# Patient Record
Sex: FEMALE | Race: WHITE | NOT HISPANIC OR LATINO
[De-identification: names, ages, dates, MRNs, and addresses within clinical notes are randomized per-mention and may not be internally consistent; named-entity substitution may affect disease eponyms.]

---

## 2017-01-03 ENCOUNTER — APPOINTMENT (OUTPATIENT)
Dept: GASTROENTEROLOGY | Facility: CLINIC | Age: 77
End: 2017-01-03

## 2017-01-03 VITALS
WEIGHT: 109 LBS | DIASTOLIC BLOOD PRESSURE: 60 MMHG | TEMPERATURE: 97.4 F | BODY MASS INDEX: 21.4 KG/M2 | HEIGHT: 60 IN | SYSTOLIC BLOOD PRESSURE: 102 MMHG | RESPIRATION RATE: 16 BRPM

## 2017-01-03 DIAGNOSIS — Z87.898 PERSONAL HISTORY OF OTHER SPECIFIED CONDITIONS: ICD-10-CM

## 2017-01-03 DIAGNOSIS — R11.10 VOMITING, UNSPECIFIED: ICD-10-CM

## 2017-01-03 DIAGNOSIS — R10.9 UNSPECIFIED ABDOMINAL PAIN: ICD-10-CM

## 2017-01-06 ENCOUNTER — OUTPATIENT (OUTPATIENT)
Dept: OUTPATIENT SERVICES | Facility: HOSPITAL | Age: 77
LOS: 1 days | Discharge: HOME | End: 2017-01-06
Payer: MEDICARE

## 2017-01-06 ENCOUNTER — APPOINTMENT (OUTPATIENT)
Dept: GASTROENTEROLOGY | Facility: HOSPITAL | Age: 77
End: 2017-01-06

## 2017-01-06 DIAGNOSIS — R19.7 DIARRHEA, UNSPECIFIED: ICD-10-CM

## 2017-01-06 PROCEDURE — 88305 TISSUE EXAM BY PATHOLOGIST: CPT | Mod: 26

## 2017-01-09 LAB — SURGICAL PATHOLOGY STUDY: SIGNIFICANT CHANGE UP

## 2017-03-13 ENCOUNTER — RX RENEWAL (OUTPATIENT)
Age: 77
End: 2017-03-13

## 2018-10-16 ENCOUNTER — APPOINTMENT (OUTPATIENT)
Dept: GASTROENTEROLOGY | Facility: CLINIC | Age: 78
End: 2018-10-16
Payer: MEDICARE

## 2018-10-16 VITALS
HEIGHT: 60 IN | WEIGHT: 110 LBS | BODY MASS INDEX: 21.6 KG/M2 | DIASTOLIC BLOOD PRESSURE: 70 MMHG | TEMPERATURE: 97.8 F | SYSTOLIC BLOOD PRESSURE: 130 MMHG

## 2018-10-16 DIAGNOSIS — B02.9 ZOSTER W/OUT COMPLICATIONS: ICD-10-CM

## 2018-10-16 DIAGNOSIS — R74.8 ABNORMAL LEVELS OF OTHER SERUM ENZYMES: ICD-10-CM

## 2018-10-16 PROCEDURE — 99214 OFFICE O/P EST MOD 30 MIN: CPT

## 2018-10-16 RX ORDER — SODIUM SULFATE, POTASSIUM SULFATE, MAGNESIUM SULFATE 17.5; 3.13; 1.6 G/ML; G/ML; G/ML
17.5-3.13-1.6 SOLUTION, CONCENTRATE ORAL
Qty: 1 | Refills: 0 | Status: DISCONTINUED | COMMUNITY
Start: 2017-01-03 | End: 2018-10-16

## 2018-10-16 RX ORDER — MECLIZINE HYDROCHLORIDE 12.5 MG/1
12.5 TABLET ORAL
Qty: 30 | Refills: 0 | Status: ACTIVE | COMMUNITY
Start: 2018-06-14

## 2019-06-25 ENCOUNTER — APPOINTMENT (OUTPATIENT)
Dept: GASTROENTEROLOGY | Facility: CLINIC | Age: 79
End: 2019-06-25
Payer: MEDICARE

## 2019-06-25 VITALS
OXYGEN SATURATION: 98 % | BODY MASS INDEX: 21.6 KG/M2 | WEIGHT: 110 LBS | HEIGHT: 60 IN | RESPIRATION RATE: 16 BRPM | DIASTOLIC BLOOD PRESSURE: 73 MMHG | SYSTOLIC BLOOD PRESSURE: 122 MMHG | TEMPERATURE: 97.8 F | HEART RATE: 75 BPM

## 2019-06-25 DIAGNOSIS — R10.30 LOWER ABDOMINAL PAIN, UNSPECIFIED: ICD-10-CM

## 2019-06-25 PROCEDURE — 99214 OFFICE O/P EST MOD 30 MIN: CPT

## 2019-06-25 RX ORDER — VALACYCLOVIR 1 G/1
1 TABLET, FILM COATED ORAL
Qty: 21 | Refills: 0 | Status: DISCONTINUED | COMMUNITY
Start: 2018-09-05 | End: 2019-06-25

## 2019-06-25 RX ORDER — GABAPENTIN 100 MG/1
100 CAPSULE ORAL
Qty: 14 | Refills: 0 | Status: DISCONTINUED | COMMUNITY
Start: 2018-09-05 | End: 2019-06-25

## 2019-06-25 RX ORDER — SUCRALFATE 1 G/1
1 TABLET ORAL
Qty: 10 | Refills: 0 | Status: DISCONTINUED | COMMUNITY
Start: 2018-06-08 | End: 2019-06-25

## 2019-06-25 RX ORDER — ALPRAZOLAM 0.5 MG/1
0.5 TABLET ORAL
Qty: 90 | Refills: 0 | Status: ACTIVE | COMMUNITY
Start: 2019-06-25 | End: 1900-01-01

## 2019-06-25 RX ORDER — VALACYCLOVIR 500 MG/1
500 TABLET, FILM COATED ORAL
Qty: 21 | Refills: 0 | Status: DISCONTINUED | COMMUNITY
Start: 2018-09-14 | End: 2019-06-25

## 2019-06-25 NOTE — PHYSICAL EXAM
[General Appearance - Alert] : alert [General Appearance - In No Acute Distress] : in no acute distress [Sclera] : the sclera and conjunctiva were normal [Extraocular Movements] : extraocular movements were intact [PERRL With Normal Accommodation] : pupils were equal in size, round, and reactive to light [Outer Ear] : the ears and nose were normal in appearance [Oropharynx] : the oropharynx was normal [Neck Appearance] : the appearance of the neck was normal [Neck Cervical Mass (___cm)] : no neck mass was observed [Jugular Venous Distention Increased] : there was no jugular-venous distention [Thyroid Diffuse Enlargement] : the thyroid was not enlarged [Thyroid Nodule] : there were no palpable thyroid nodules [Auscultation Breath Sounds / Voice Sounds] : lungs were clear to auscultation bilaterally [Heart Sounds] : normal S1 and S2 [Heart Rate And Rhythm] : heart rate was normal and rhythm regular [Murmurs] : no murmurs [Heart Sounds Gallop] : no gallops [Heart Sounds Pericardial Friction Rub] : no pericardial rub [Abdomen Soft] : soft [Bowel Sounds] : normal bowel sounds [Abdomen Tenderness] : non-tender [] : no hepato-splenomegaly [Abdomen Mass (___ Cm)] : no abdominal mass palpated [No CVA Tenderness] : no ~M costovertebral angle tenderness [No Spinal Tenderness] : no spinal tenderness [Abnormal Walk] : normal gait [Nail Clubbing] : no clubbing  or cyanosis of the fingernails [Musculoskeletal - Swelling] : no joint swelling seen [Motor Tone] : muscle strength and tone were normal [Oriented To Time, Place, And Person] : oriented to person, place, and time [Impaired Insight] : insight and judgment were intact [Affect] : the affect was normal

## 2019-06-25 NOTE — HISTORY OF PRESENT ILLNESS
[FreeTextEntry1] : Patient is a 79-year-old female with complaints of suprapubic cramping which started about 6 weeks ago. The discomfort occurs daily. She saw her gynecologist and GYN exam was negative apparently. Her abdominal discomfort is associated with some nausea. She had a UTI 3 weeks ago and took Cipro for about 10-14 days. Her bowel movements are regular. She had one episode of rectal urgency and needs to have an immediate bowel movement. She denies any fever. She denies any blood in the stool.\par Patient had a colonoscopy in 2017. 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 She has not had a flare in over 2 years. She still has some  irritable bowel syndrome symptoms.\par

## 2019-06-25 NOTE — ASSESSMENT
[FreeTextEntry1] : Patient with 6 week history of lower abdominal discomfort. There is no evidence of ischemic colitis or diverticulitis. This may be an exacerbation of her IBS symptoms.\par She does have a history of liver cysts and a pancreatic cyst and a followup a sonogram will be ordered.\par The patient will be suggested to take IB jeannette and resume Prilosec daily.

## 2019-11-05 ENCOUNTER — OUTPATIENT (OUTPATIENT)
Dept: OUTPATIENT SERVICES | Facility: HOSPITAL | Age: 79
LOS: 1 days | End: 2019-11-05
Payer: MEDICARE

## 2019-11-05 VITALS
RESPIRATION RATE: 16 BRPM | WEIGHT: 113.1 LBS | OXYGEN SATURATION: 100 % | HEIGHT: 60 IN | TEMPERATURE: 98 F | DIASTOLIC BLOOD PRESSURE: 74 MMHG | SYSTOLIC BLOOD PRESSURE: 172 MMHG | HEART RATE: 76 BPM

## 2019-11-05 DIAGNOSIS — Z98.890 OTHER SPECIFIED POSTPROCEDURAL STATES: Chronic | ICD-10-CM

## 2019-11-05 DIAGNOSIS — R94.39 ABNORMAL RESULT OF OTHER CARDIOVASCULAR FUNCTION STUDY: ICD-10-CM

## 2019-11-05 DIAGNOSIS — Z98.51 TUBAL LIGATION STATUS: Chronic | ICD-10-CM

## 2019-11-05 LAB
ALBUMIN SERPL ELPH-MCNC: 4.5 G/DL — SIGNIFICANT CHANGE UP (ref 3.3–5)
ALP SERPL-CCNC: 126 U/L — HIGH (ref 40–120)
ALT FLD-CCNC: 26 U/L — SIGNIFICANT CHANGE UP (ref 10–45)
ANION GAP SERPL CALC-SCNC: 16 MMOL/L — SIGNIFICANT CHANGE UP (ref 5–17)
AST SERPL-CCNC: 30 U/L — SIGNIFICANT CHANGE UP (ref 10–40)
BILIRUB SERPL-MCNC: 0.8 MG/DL — SIGNIFICANT CHANGE UP (ref 0.2–1.2)
BUN SERPL-MCNC: 15 MG/DL — SIGNIFICANT CHANGE UP (ref 7–23)
CALCIUM SERPL-MCNC: 9.9 MG/DL — SIGNIFICANT CHANGE UP (ref 8.4–10.5)
CHLORIDE SERPL-SCNC: 98 MMOL/L — SIGNIFICANT CHANGE UP (ref 96–108)
CO2 SERPL-SCNC: 19 MMOL/L — LOW (ref 22–31)
CREAT SERPL-MCNC: 0.92 MG/DL — SIGNIFICANT CHANGE UP (ref 0.5–1.3)
GLUCOSE SERPL-MCNC: 92 MG/DL — SIGNIFICANT CHANGE UP (ref 70–99)
HCT VFR BLD CALC: 40.1 % — SIGNIFICANT CHANGE UP (ref 34.5–45)
HGB BLD-MCNC: 13.5 G/DL — SIGNIFICANT CHANGE UP (ref 11.5–15.5)
MCHC RBC-ENTMCNC: 30 PG — SIGNIFICANT CHANGE UP (ref 27–34)
MCHC RBC-ENTMCNC: 33.7 GM/DL — SIGNIFICANT CHANGE UP (ref 32–36)
MCV RBC AUTO: 89.1 FL — SIGNIFICANT CHANGE UP (ref 80–100)
NRBC # BLD: 0 /100 WBCS — SIGNIFICANT CHANGE UP (ref 0–0)
PLATELET # BLD AUTO: 205 K/UL — SIGNIFICANT CHANGE UP (ref 150–400)
POTASSIUM SERPL-MCNC: 4.6 MMOL/L — SIGNIFICANT CHANGE UP (ref 3.5–5.3)
POTASSIUM SERPL-SCNC: 4.6 MMOL/L — SIGNIFICANT CHANGE UP (ref 3.5–5.3)
PROT SERPL-MCNC: 7.5 G/DL — SIGNIFICANT CHANGE UP (ref 6–8.3)
RBC # BLD: 4.5 M/UL — SIGNIFICANT CHANGE UP (ref 3.8–5.2)
RBC # FLD: 11.8 % — SIGNIFICANT CHANGE UP (ref 10.3–14.5)
SODIUM SERPL-SCNC: 133 MMOL/L — LOW (ref 135–145)
WBC # BLD: 7.21 K/UL — SIGNIFICANT CHANGE UP (ref 3.8–10.5)
WBC # FLD AUTO: 7.21 K/UL — SIGNIFICANT CHANGE UP (ref 3.8–10.5)

## 2019-11-05 PROCEDURE — 85027 COMPLETE CBC AUTOMATED: CPT

## 2019-11-05 PROCEDURE — 93010 ELECTROCARDIOGRAM REPORT: CPT

## 2019-11-05 PROCEDURE — C1887: CPT

## 2019-11-05 PROCEDURE — 93005 ELECTROCARDIOGRAM TRACING: CPT

## 2019-11-05 PROCEDURE — 80053 COMPREHEN METABOLIC PANEL: CPT

## 2019-11-05 PROCEDURE — 93458 L HRT ARTERY/VENTRICLE ANGIO: CPT

## 2019-11-05 PROCEDURE — C1769: CPT

## 2019-11-05 PROCEDURE — C1894: CPT

## 2019-11-05 RX ORDER — MECLIZINE HCL 12.5 MG
1 TABLET ORAL
Qty: 0 | Refills: 0 | DISCHARGE

## 2019-11-05 RX ORDER — SODIUM CHLORIDE 9 MG/ML
3 INJECTION INTRAMUSCULAR; INTRAVENOUS; SUBCUTANEOUS EVERY 8 HOURS
Refills: 0 | Status: DISCONTINUED | OUTPATIENT
Start: 2019-11-05 | End: 2019-11-22

## 2019-11-05 RX ORDER — ASPIRIN/CALCIUM CARB/MAGNESIUM 324 MG
1 TABLET ORAL
Qty: 0 | Refills: 0 | DISCHARGE

## 2019-11-05 RX ORDER — ALPRAZOLAM 0.25 MG
1 TABLET ORAL
Qty: 0 | Refills: 0 | DISCHARGE

## 2019-11-05 RX ORDER — LEVOTHYROXINE SODIUM 125 MCG
1 TABLET ORAL
Qty: 0 | Refills: 0 | DISCHARGE

## 2019-11-05 RX ORDER — CALCITRIOL 0.5 UG/1
1 CAPSULE ORAL
Qty: 0 | Refills: 0 | DISCHARGE

## 2019-11-05 RX ORDER — ATORVASTATIN CALCIUM 80 MG/1
1 TABLET, FILM COATED ORAL
Qty: 0 | Refills: 0 | DISCHARGE

## 2019-11-05 NOTE — H&P CARDIOLOGY - FAMILY HISTORY
Mother  Still living? No  Family history of heart attack, Age at diagnosis: Age Unknown     Sibling  Still living? Yes, Estimated age: Age Unknown  Family history of CABG, Age at diagnosis: Age Unknown     Father  Still living? No  Family history of liver cancer, Age at diagnosis: Age Unknown

## 2019-11-05 NOTE — H&P CARDIOLOGY - HISTORY OF PRESENT ILLNESS
78 yo female no implantable device with PMHx of HTN, HLD, hypothyroidism, anxiety present for cardiac cath. pt reports she has been feeling chest heaviness, not affected with movement, evaluated by Dr. Espinosa and had abnormal stress test. pt denies chest pain, SOB or dizziness currently.   Stress test on 10/29/2019: mildly reduced perfusion defect of small size in anteroapical wall, EF 72%

## 2019-12-17 ENCOUNTER — APPOINTMENT (OUTPATIENT)
Dept: GASTROENTEROLOGY | Facility: CLINIC | Age: 79
End: 2019-12-17
Payer: MEDICARE

## 2019-12-17 VITALS
HEART RATE: 65 BPM | SYSTOLIC BLOOD PRESSURE: 125 MMHG | RESPIRATION RATE: 17 BRPM | DIASTOLIC BLOOD PRESSURE: 75 MMHG | OXYGEN SATURATION: 94 % | WEIGHT: 112 LBS | HEIGHT: 60 IN | BODY MASS INDEX: 21.99 KG/M2 | TEMPERATURE: 97.4 F

## 2019-12-17 DIAGNOSIS — R07.89 OTHER CHEST PAIN: ICD-10-CM

## 2019-12-17 PROCEDURE — 99214 OFFICE O/P EST MOD 30 MIN: CPT

## 2019-12-17 NOTE — ASSESSMENT
[FreeTextEntry1] : Patient with history of ischemic colitis and irritable bowel syndrome. Both are stable.\par \par She had several episodes of substernal chest discomfort. Cardiac workup was negative. There was no evidence of pulmonary embolism.\par She may be suffering from esophageal spasm. She was told to take Gaviscon on a p.r.n. basis when these symptoms occur.\par

## 2019-12-17 NOTE — PHYSICAL EXAM
[General Appearance - Alert] : alert [General Appearance - In No Acute Distress] : in no acute distress [Sclera] : the sclera and conjunctiva were normal [PERRL With Normal Accommodation] : pupils were equal in size, round, and reactive to light [Extraocular Movements] : extraocular movements were intact [Outer Ear] : the ears and nose were normal in appearance [Oropharynx] : the oropharynx was normal [Neck Cervical Mass (___cm)] : no neck mass was observed [Neck Appearance] : the appearance of the neck was normal [Jugular Venous Distention Increased] : there was no jugular-venous distention [Thyroid Diffuse Enlargement] : the thyroid was not enlarged [Thyroid Nodule] : there were no palpable thyroid nodules [Auscultation Breath Sounds / Voice Sounds] : lungs were clear to auscultation bilaterally [Heart Sounds Gallop] : no gallops [Heart Rate And Rhythm] : heart rate was normal and rhythm regular [Heart Sounds] : normal S1 and S2 [Murmurs] : no murmurs [Heart Sounds Pericardial Friction Rub] : no pericardial rub [Bowel Sounds] : normal bowel sounds [Abdomen Soft] : soft [Abdomen Tenderness] : non-tender [] : no hepato-splenomegaly [Abdomen Mass (___ Cm)] : no abdominal mass palpated [No CVA Tenderness] : no ~M costovertebral angle tenderness [No Spinal Tenderness] : no spinal tenderness [Abnormal Walk] : normal gait [Nail Clubbing] : no clubbing  or cyanosis of the fingernails [Musculoskeletal - Swelling] : no joint swelling seen [Motor Tone] : muscle strength and tone were normal [Impaired Insight] : insight and judgment were intact [Oriented To Time, Place, And Person] : oriented to person, place, and time [Affect] : the affect was normal

## 2019-12-17 NOTE — HISTORY OF PRESENT ILLNESS
[FreeTextEntry1] : Patient is a 79-year-old female with complaints of substernal chest pressure last month. Had cardiac w/u including cath with no evidence of CAD according to patient. Has not any recurrence. No heartburn or regurgitation.\par Also had elevated D dimer and PE r/o. \par \par Patient had a colonoscopy in 2017. 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 She has not had a flare in almost 3  years. She still has some  irritable bowel syndrome symptoms. She is off IB jeannette.\par

## 2020-04-07 PROBLEM — F41.9 ANXIETY DISORDER, UNSPECIFIED: Chronic | Status: ACTIVE | Noted: 2019-11-05

## 2020-04-07 PROBLEM — E78.5 HYPERLIPIDEMIA, UNSPECIFIED: Chronic | Status: ACTIVE | Noted: 2019-11-05

## 2020-04-07 PROBLEM — E03.9 HYPOTHYROIDISM, UNSPECIFIED: Chronic | Status: ACTIVE | Noted: 2019-11-05

## 2020-04-07 PROBLEM — R42 DIZZINESS AND GIDDINESS: Chronic | Status: ACTIVE | Noted: 2019-11-05

## 2020-04-07 PROBLEM — I10 ESSENTIAL (PRIMARY) HYPERTENSION: Chronic | Status: ACTIVE | Noted: 2019-11-05

## 2020-06-02 ENCOUNTER — APPOINTMENT (OUTPATIENT)
Dept: GASTROENTEROLOGY | Facility: CLINIC | Age: 80
End: 2020-06-02

## 2020-10-02 ENCOUNTER — APPOINTMENT (OUTPATIENT)
Dept: GASTROENTEROLOGY | Facility: CLINIC | Age: 80
End: 2020-10-02
Payer: MEDICARE

## 2020-10-02 VITALS
HEART RATE: 79 BPM | TEMPERATURE: 98.2 F | OXYGEN SATURATION: 96 % | SYSTOLIC BLOOD PRESSURE: 147 MMHG | WEIGHT: 112 LBS | HEIGHT: 60 IN | RESPIRATION RATE: 17 BRPM | DIASTOLIC BLOOD PRESSURE: 78 MMHG | BODY MASS INDEX: 21.99 KG/M2

## 2020-10-02 DIAGNOSIS — R10.9 UNSPECIFIED ABDOMINAL PAIN: ICD-10-CM

## 2020-10-02 PROCEDURE — 99214 OFFICE O/P EST MOD 30 MIN: CPT

## 2020-10-02 RX ORDER — WHEAT DEXTRIN 3 G/4 G
POWDER (GRAM) ORAL
Qty: 1 | Refills: 0 | Status: ACTIVE | OUTPATIENT
Start: 2020-10-02

## 2020-10-02 NOTE — REVIEW OF SYSTEMS
[As Noted in HPI] : as noted in HPI [Abdominal Pain] : abdominal pain [Diarrhea] : diarrhea [Negative] : Heme/Lymph [Vomiting] : no vomiting [Constipation] : no constipation [Heartburn] : no heartburn [Melena] : no melena

## 2020-10-02 NOTE — ASSESSMENT
[FreeTextEntry1] : 1- Abdominal Pain\par Sonogram of the Abdomen and Pelvis ordered the risks benefits alternatives and complications of the procedure/s were explained to the patient at length. The patient was agreeable and we will proceed.\par \par There is no evidence of colitis or diverticulitis. This may be an exacerbation of her IBS symptoms. \par Patient will continue to take IB-Guard as prescribed for IBS symptoms. \par \par 2- Diarrhea \par \par Patient started on Flagyl PO for 5 day course discussed at length side effects and adverse reactions. \par Instructed patient to take the probiotic Align to restore gut of normal. \par \par \par We discussed diarrhea at length. Treatment depends on the cause and severity of your diarrhea. You\par should drink plenty of fluids to avoid dehydration. You should avoid drinks that contain caffeine and\par milk. Milk may make diarrhea worse. Your doctor may recommend hydration drinks for your infant or\par child. People with severe dehydration may need fluid replacement via an IV line and hospitalization.\par Avoid eating greasy foods, fatty foods, and alcohol. Bananas, applesauce, rice, and toast are helpful\par foods to eat. If you feel too sick to eat, try sucking on ice chips until you can tolerate food.\par \par Patient verbalized understanding of all information provided. All questions answered and reviewed. \par

## 2020-10-02 NOTE — PHYSICAL EXAM
[General Appearance - Alert] : alert [General Appearance - In No Acute Distress] : in no acute distress [Sclera] : the sclera and conjunctiva were normal [PERRL With Normal Accommodation] : pupils were equal in size, round, and reactive to light [Extraocular Movements] : extraocular movements were intact [Outer Ear] : the ears and nose were normal in appearance [Oropharynx] : the oropharynx was normal [Neck Appearance] : the appearance of the neck was normal [Neck Cervical Mass (___cm)] : no neck mass was observed [Jugular Venous Distention Increased] : there was no jugular-venous distention [Thyroid Diffuse Enlargement] : the thyroid was not enlarged [Thyroid Nodule] : there were no palpable thyroid nodules [] : no respiratory distress [Auscultation Breath Sounds / Voice Sounds] : lungs were clear to auscultation bilaterally [Heart Rate And Rhythm] : heart rate was normal and rhythm regular [Heart Sounds] : normal S1 and S2 [Heart Sounds Gallop] : no gallops [Murmurs] : no murmurs [Heart Sounds Pericardial Friction Rub] : no pericardial rub [Flat] : flat [Normal] : normal [Soft, Nontender] : the abdomen was soft and nontender [Cervical Lymph Nodes Enlarged Posterior Bilaterally] : posterior cervical [Cervical Lymph Nodes Enlarged Anterior Bilaterally] : anterior cervical [Supraclavicular Lymph Nodes Enlarged Bilaterally] : supraclavicular [No CVA Tenderness] : no ~M costovertebral angle tenderness [No Spinal Tenderness] : no spinal tenderness [Oriented To Time, Place, And Person] : oriented to person, place, and time [Impaired Insight] : insight and judgment were intact [Affect] : the affect was normal [Firm] : not firm [Rigid] : not rigid [Rebound] : no rebound [Guarding] : no guarding

## 2020-10-02 NOTE — HISTORY OF PRESENT ILLNESS
[de-identified] : Patient is a 80-year-old female with complaints of suprapubic cramping which started about 6 weeks ago. The discomfort occurs intermittently. She saw her gynecologist and GYN exam was negative apparently. Her abdominal discomfort is not associated with nausea or vomiting. She previously visited [place] and noticed the abdominal pain along with diarrhea. She denies any fever. She denies any blood in the stool. No recent colitis episodes at his time. Patient had a colonoscopy in 2017. 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 She has not had a flare in over 3 years. She still has some irritable bowel syndrome symptoms.\par

## 2021-04-13 ENCOUNTER — APPOINTMENT (OUTPATIENT)
Dept: GASTROENTEROLOGY | Facility: CLINIC | Age: 81
End: 2021-04-13
Payer: MEDICARE

## 2021-04-13 VITALS
WEIGHT: 115 LBS | SYSTOLIC BLOOD PRESSURE: 135 MMHG | HEART RATE: 73 BPM | RESPIRATION RATE: 17 BRPM | HEIGHT: 60 IN | TEMPERATURE: 97.5 F | DIASTOLIC BLOOD PRESSURE: 63 MMHG | OXYGEN SATURATION: 98 % | BODY MASS INDEX: 22.58 KG/M2

## 2021-04-13 DIAGNOSIS — K52.9 NONINFECTIVE GASTROENTERITIS AND COLITIS, UNSPECIFIED: ICD-10-CM

## 2021-04-13 PROCEDURE — 99214 OFFICE O/P EST MOD 30 MIN: CPT

## 2021-04-13 RX ORDER — BISMUTH SUBSALICYLATE 525 MG/1
TABLET ORAL
Qty: 30 | Refills: 0 | Status: ACTIVE | COMMUNITY
Start: 2020-10-02

## 2021-04-13 NOTE — ASSESSMENT
[FreeTextEntry1] : Patient with stable IBS and no further episodes of ischemic colitis.\par She does not have hepatic cysts and on her last sonogram there was some increase in the size of the left hepatic cysts.  She does have a family history of liver cancer and a follow-up sonogram will be ordered.\par She will continue to take IBgard and align on a as needed basis.

## 2021-04-13 NOTE — HISTORY OF PRESENT ILLNESS
[FreeTextEntry1] : Patient is a 80-year-old female with complaints of substernal chest pressure last year. Had cardiac w/u including cath with no evidence of CAD according to patient. Has not any recurrence. No heartburn or regurgitation.\par Also had elevated D dimer and PE r/o. \par Patient had a colonoscopy in 2017. 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 She has not had a flare in almost 4  years. She still has some  irritable bowel syndrome symptoms. She is on IB jeannette PRN and Align PRN.\par \par She had an abdominal sonogram in October that revealed some enlargement of her left hepatic liver cysts.  This was compared to a previous sonogram.  She has no pain.  Her weight is stable.\par The cyst on the right lobe is stable.  She does have a family history of liver cancer.  Her father had liver cancer.\par \par \par

## 2021-07-30 ENCOUNTER — APPOINTMENT (OUTPATIENT)
Dept: GASTROENTEROLOGY | Facility: CLINIC | Age: 81
End: 2021-07-30
Payer: MEDICARE

## 2021-07-30 VITALS
WEIGHT: 107 LBS | SYSTOLIC BLOOD PRESSURE: 163 MMHG | TEMPERATURE: 98.1 F | DIASTOLIC BLOOD PRESSURE: 79 MMHG | BODY MASS INDEX: 21.01 KG/M2 | OXYGEN SATURATION: 99 % | HEIGHT: 60 IN | HEART RATE: 69 BPM

## 2021-07-30 DIAGNOSIS — R63.4 ABNORMAL WEIGHT LOSS: ICD-10-CM

## 2021-07-30 DIAGNOSIS — Z23 ENCOUNTER FOR IMMUNIZATION: ICD-10-CM

## 2021-07-30 DIAGNOSIS — R68.81 EARLY SATIETY: ICD-10-CM

## 2021-07-30 DIAGNOSIS — R63.0 ANOREXIA: ICD-10-CM

## 2021-07-30 PROCEDURE — 99214 OFFICE O/P EST MOD 30 MIN: CPT

## 2021-07-30 RX ORDER — METRONIDAZOLE 500 MG/1
500 TABLET ORAL 3 TIMES DAILY
Qty: 15 | Refills: 0 | Status: DISCONTINUED | COMMUNITY
Start: 2020-10-02 | End: 2021-07-30

## 2021-08-01 PROBLEM — R63.4 WEIGHT LOSS: Status: ACTIVE | Noted: 2021-07-30

## 2021-08-01 PROBLEM — Z23 COVID-19 VACCINE ADMINISTERED: Status: ACTIVE | Noted: 2021-07-30

## 2021-08-01 RX ORDER — ATORVASTATIN CALCIUM 10 MG/1
10 TABLET, FILM COATED ORAL
Qty: 90 | Refills: 0 | Status: ACTIVE | COMMUNITY
Start: 2021-07-08

## 2021-08-01 RX ORDER — LATANOPROST/PF 0.005 %
0.01 DROPS OPHTHALMIC (EYE)
Qty: 8 | Refills: 0 | Status: ACTIVE | COMMUNITY
Start: 2021-07-07

## 2021-08-01 RX ORDER — PEPPERMINT OIL 90 MG
90 CAPSULE, DELAYED, AND EXTENDED RELEASE ORAL
Qty: 60 | Refills: 0 | Status: ACTIVE | COMMUNITY
Start: 2020-10-02

## 2021-08-01 NOTE — REVIEW OF SYSTEMS
[Feeling Tired] : feeling tired [Recent Weight Loss (___ Lbs)] : recent [unfilled] ~Ulb weight loss [Eyesight Problems] : eyesight problems [As Noted in HPI] : as noted in HPI [Negative] : Heme/Lymph

## 2021-08-01 NOTE — ASSESSMENT
[FreeTextEntry1] : Patient with early satiety and decreased appetite.  She claims that she continues to lose some weight.  She has no abdominal pain and her nausea has improved.  She has no further chest pain.\par Because of her enlarging hepatic cysts and family history of liver cancer, a CAT scan of the abdomen and pelvis will be ordered.  An upper endoscopy will be scheduled\par She had recent blood work and she will bring that into the office.

## 2021-08-01 NOTE — HISTORY OF PRESENT ILLNESS
[FreeTextEntry1] : Patient is a 80-year-old female with complaints of substernal chest pressure last year. Had cardiac w/u including cath with no evidence of CAD according to patient. Has not any recurrence. No heartburn or regurgitation.\par Also had elevated D dimer and PE r/o. \par Patient had a colonoscopy in 2017. 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 She has not had a flare in almost 4  years. She still has some  irritable bowel syndrome symptoms. She is on IB jeannette PRN and Align PRN.\par \par She had an abdominal sonogram in October that revealed some enlargement of her left hepatic liver cysts.  This was compared to a previous sonogram.  She has no pain.  Her weight is stable.\par The cyst on the right lobe is stable.  She does have a family history of liver cancer.  Her father had liver cancer.\par \par Patient has no further chest pain no nausea.  Her bowel movements are irregular with alternating normal bowel movements and constipation.  She takes milk of magnesia on a as needed basis.  She has had no further attacks of abdominal pain.\par However, she is eating smaller portions and has a decreased appetite and has lost about 8 pounds.\par She recently had a pelvic sonogram that revealed some fibroids but no other lesions.\par \par \par \par \par

## 2021-08-01 NOTE — PHYSICAL EXAM
[General Appearance - Alert] : alert [General Appearance - In No Acute Distress] : in no acute distress [Sclera] : the sclera and conjunctiva were normal [PERRL With Normal Accommodation] : pupils were equal in size, round, and reactive to light [Extraocular Movements] : extraocular movements were intact [Outer Ear] : the ears and nose were normal in appearance [Oropharynx] : the oropharynx was normal [Neck Appearance] : the appearance of the neck was normal [Neck Cervical Mass (___cm)] : no neck mass was observed [Jugular Venous Distention Increased] : there was no jugular-venous distention [Thyroid Diffuse Enlargement] : the thyroid was not enlarged [Thyroid Nodule] : there were no palpable thyroid nodules [Auscultation Breath Sounds / Voice Sounds] : lungs were clear to auscultation bilaterally [Heart Rate And Rhythm] : heart rate was normal and rhythm regular [Heart Sounds] : normal S1 and S2 [Heart Sounds Gallop] : no gallops [Murmurs] : no murmurs [Heart Sounds Pericardial Friction Rub] : no pericardial rub [Bowel Sounds] : normal bowel sounds [Abdomen Soft] : soft [Abdomen Tenderness] : non-tender [] : no hepato-splenomegaly [Abdomen Mass (___ Cm)] : no abdominal mass palpated [No CVA Tenderness] : no ~M costovertebral angle tenderness [No Spinal Tenderness] : no spinal tenderness [Abnormal Walk] : normal gait [Nail Clubbing] : no clubbing  or cyanosis of the fingernails [Motor Tone] : muscle strength and tone were normal [Musculoskeletal - Swelling] : no joint swelling seen [Oriented To Time, Place, And Person] : oriented to person, place, and time [Impaired Insight] : insight and judgment were intact [Affect] : the affect was normal

## 2021-09-21 ENCOUNTER — APPOINTMENT (OUTPATIENT)
Dept: GASTROENTEROLOGY | Facility: CLINIC | Age: 81
End: 2021-09-21

## 2021-09-21 DIAGNOSIS — Z20.822 CONTACT WITH AND (SUSPECTED) EXPOSURE TO COVID-19: ICD-10-CM

## 2021-09-24 ENCOUNTER — APPOINTMENT (OUTPATIENT)
Dept: GASTROENTEROLOGY | Facility: AMBULATORY MEDICAL SERVICES | Age: 81
End: 2021-09-24

## 2021-12-21 ENCOUNTER — APPOINTMENT (OUTPATIENT)
Dept: GASTROENTEROLOGY | Facility: CLINIC | Age: 81
End: 2021-12-21
Payer: MEDICARE

## 2021-12-21 VITALS
SYSTOLIC BLOOD PRESSURE: 135 MMHG | DIASTOLIC BLOOD PRESSURE: 83 MMHG | BODY MASS INDEX: 19.04 KG/M2 | RESPIRATION RATE: 18 BRPM | OXYGEN SATURATION: 100 % | HEART RATE: 97 BPM | TEMPERATURE: 97.2 F | WEIGHT: 97 LBS | HEIGHT: 60 IN

## 2021-12-21 DIAGNOSIS — Z09 ENCOUNTER FOR FOLLOW-UP EXAMINATION AFTER COMPLETED TREATMENT FOR CONDITIONS OTHER THAN MALIGNANT NEOPLASM: ICD-10-CM

## 2021-12-21 DIAGNOSIS — R10.32 LEFT LOWER QUADRANT PAIN: ICD-10-CM

## 2021-12-21 PROCEDURE — 99214 OFFICE O/P EST MOD 30 MIN: CPT

## 2021-12-21 PROCEDURE — 36415 COLL VENOUS BLD VENIPUNCTURE: CPT

## 2021-12-21 NOTE — ASSESSMENT
[FreeTextEntry1] : Attending Attestation \par \par As per Dr Ayala she will take Xifaxan as prescribed. She will have blood work and stool cultures taken. Instructions provided to patient how to obtain sample and where to return specimen. \par \par We discussed diarrhea at length. Treatment depends on the cause and severity of your diarrhea. You\par should drink plenty of fluids to avoid dehydration. You should avoid drinks that contain caffeine and\par milk. Milk may make diarrhea worse. Your doctor may recommend hydration drinks for your infant or\par child. People with severe dehydration may need fluid replacement via an IV line and hospitalization.\par Avoid eating greasy foods, fatty foods, and alcohol. Bananas, applesauce, rice, and toast are helpful\par foods to eat. If you feel too sick to eat, try sucking on ice chips until you can tolerate food.\par \par She was instructed to increase fluids along with increasing electrolytes with Gatorade or similar drink. \par \par Time spent with patient 30 min \par \par Patient verbalized understanding of all information provided. All questions answered and reviewed. \par \par She will f/u in office in 4 weeks. \par \par

## 2021-12-21 NOTE — HISTORY OF PRESENT ILLNESS
[de-identified] : Patient is a 81-year-old female with complaints of diarrhea ongoing for over 1 week. She went out to eat and had shrimp pasta and chicken cutlets. She is not sure what food item caused her to have loose stool and lower abdominal cramping. She was treated with Cipro and Flagyl by Dr Ayala for a complaint of LLQ pain on Dec 15 for possible diverticulitis. She has since completed treatment and no longer experiencing abdominal pain. \par \par Patient had a colonoscopy in 2017. 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 She has not had a flare in almost 4 years. She still has some irritable bowel syndrome symptoms. She is on IB jeannette PRN and Align PRN.\par \par She had an abdominal sonogram in October that revealed some enlargement of her left hepatic liver cysts. This was compared to a previous sonogram. She has no pain. Her weight is stable.\par The cyst on the right lobe is stable. She does have a family history of liver cancer. Her father had liver cancer.\par She recently had a pelvic sonogram that revealed some fibroids but no other lesions.\par \par \par She admits to not starting the Xiafixan that was prescribed to her. SHe will begin taking that medication in 2 days. She states her last episode of loose stool was yesterday and she stopped taking the Imodium as well. She is still having intermittent rectal bleeding with mucous. \par Denies melena or hematemesis.

## 2021-12-21 NOTE — PHYSICAL EXAM
[General Appearance - Alert] : alert [General Appearance - In No Acute Distress] : in no acute distress [Sclera] : the sclera and conjunctiva were normal [PERRL With Normal Accommodation] : pupils were equal in size, round, and reactive to light [Extraocular Movements] : extraocular movements were intact [Outer Ear] : the ears and nose were normal in appearance [Oropharynx] : the oropharynx was normal [Neck Appearance] : the appearance of the neck was normal [Neck Cervical Mass (___cm)] : no neck mass was observed [Jugular Venous Distention Increased] : there was no jugular-venous distention [Thyroid Diffuse Enlargement] : the thyroid was not enlarged [Thyroid Nodule] : there were no palpable thyroid nodules [] : no respiratory distress [Auscultation Breath Sounds / Voice Sounds] : lungs were clear to auscultation bilaterally [Heart Rate And Rhythm] : heart rate was normal and rhythm regular [Heart Sounds] : normal S1 and S2 [Heart Sounds Gallop] : no gallops [Murmurs] : no murmurs [Heart Sounds Pericardial Friction Rub] : no pericardial rub [Flat] : flat [Soft, Nontender] : the abdomen was soft and nontender [Normal] : normal to percussion [Cervical Lymph Nodes Enlarged Posterior Bilaterally] : posterior cervical [Cervical Lymph Nodes Enlarged Anterior Bilaterally] : anterior cervical [Supraclavicular Lymph Nodes Enlarged Bilaterally] : supraclavicular [Axillary Lymph Nodes Enlarged Bilaterally] : axillary [Femoral Lymph Nodes Enlarged Bilaterally] : femoral [Inguinal Lymph Nodes Enlarged Bilaterally] : inguinal [No CVA Tenderness] : no ~M costovertebral angle tenderness [No Spinal Tenderness] : no spinal tenderness [Abnormal Walk] : normal gait [Nail Clubbing] : no clubbing  or cyanosis of the fingernails [Musculoskeletal - Swelling] : no joint swelling seen [Motor Tone] : muscle strength and tone were normal [Deep Tendon Reflexes (DTR)] : deep tendon reflexes were 2+ and symmetric [Sensation] : the sensory exam was normal to light touch and pinprick [No Focal Deficits] : no focal deficits [Oriented To Time, Place, And Person] : oriented to person, place, and time [Impaired Insight] : insight and judgment were intact [Affect] : the affect was normal [Firm] : not firm [Rigid] : not rigid [Rebound] : no rebound [Guarding] : no guarding [Lucero's] : a negative Lucero's sign

## 2021-12-22 LAB
ALBUMIN SERPL ELPH-MCNC: 3.8 G/DL
ALP BLD-CCNC: 83 U/L
ALT SERPL-CCNC: 19 U/L
ANION GAP SERPL CALC-SCNC: 20 MMOL/L
AST SERPL-CCNC: 37 U/L
BILIRUB SERPL-MCNC: 0.2 MG/DL
BUN SERPL-MCNC: 16 MG/DL
CALCIUM SERPL-MCNC: 9.4 MG/DL
CANCER AG19-9 SERPL-ACNC: 12 U/ML
CEA SERPL-MCNC: 5.9 NG/ML
CHLORIDE SERPL-SCNC: 101 MMOL/L
CO2 SERPL-SCNC: 15 MMOL/L
CREAT SERPL-MCNC: 0.94 MG/DL
CRP SERPL-MCNC: 17 MG/L
GLUCOSE SERPL-MCNC: 111 MG/DL
POTASSIUM SERPL-SCNC: 5.7 MMOL/L
PROT SERPL-MCNC: 6.7 G/DL
SODIUM SERPL-SCNC: 136 MMOL/L

## 2022-01-10 LAB — HEMOCCULT STL QL IA: POSITIVE

## 2022-01-11 ENCOUNTER — APPOINTMENT (OUTPATIENT)
Dept: GASTROENTEROLOGY | Facility: CLINIC | Age: 82
End: 2022-01-11
Payer: MEDICARE

## 2022-01-11 VITALS
RESPIRATION RATE: 18 BRPM | BODY MASS INDEX: 19.63 KG/M2 | HEART RATE: 73 BPM | WEIGHT: 100 LBS | DIASTOLIC BLOOD PRESSURE: 72 MMHG | SYSTOLIC BLOOD PRESSURE: 146 MMHG | HEIGHT: 60 IN | OXYGEN SATURATION: 100 % | TEMPERATURE: 97.3 F

## 2022-01-11 DIAGNOSIS — R19.5 OTHER FECAL ABNORMALITIES: ICD-10-CM

## 2022-01-11 DIAGNOSIS — K52.9 NONINFECTIVE GASTROENTERITIS AND COLITIS, UNSPECIFIED: ICD-10-CM

## 2022-01-11 DIAGNOSIS — R19.7 DIARRHEA, UNSPECIFIED: ICD-10-CM

## 2022-01-11 DIAGNOSIS — K62.5 HEMORRHAGE OF ANUS AND RECTUM: ICD-10-CM

## 2022-01-11 PROCEDURE — 99214 OFFICE O/P EST MOD 30 MIN: CPT

## 2022-01-11 NOTE — ASSESSMENT
[FreeTextEntry1] : Attending Attestation \par \par Positive FOBT / Elevated CEA \par \par EGD and Colonoscopy procedure ordered The risks benefits alternatives and complications of the procedure/s were explained to the patient at length. The patient was agreeable and we will proceed. Preparation for procedure discussed reviewed side effects and adverse reactions to prep. \par \par \par Time spent with patient 30 min \par \par \par Patient verbalized understanding of all information provided. All questions answered and reviewed. \par

## 2022-01-11 NOTE — HISTORY OF PRESENT ILLNESS
[de-identified] : Patient is a 81-year-old female presents for follow up for complaints of diarrhea.  No longer ongoing. She was treated with Cipro and Flagyl by Dr Ayala for a complaint of LLQ pain on Dec 15 for possible diverticulitis. She has since completed treatment and no longer experiencing abdominal pain. \par \par Patient had a colonoscopy in 2017. 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 She has not had a flare in almost 4 years. She still has some irritable bowel syndrome symptoms. She is on IB jeannette PRN and Align PRN.\par \par She had an abdominal sonogram in October that revealed some enlargement of her left hepatic liver cysts. This was compared to a previous sonogram. She has no pain. Her weight is stable.\par The cyst on the right lobe is stable. She does have a family history of liver cancer. Her father had liver cancer.\par She recently had a pelvic sonogram that revealed some fibroids but no other lesions.\par \par She is no longer having intermittent rectal bleeding with mucous. Bowel movements have returned to normal.\par Denies melena or hematemesis. \par \par Patient with elevated CEA and postive FOBT resulted on 12/21/21\par

## 2022-01-13 LAB — GI PCR PANEL, STOOL: NORMAL

## 2022-01-18 LAB — CALPROTECTIN FECAL: 298 UG/G

## 2022-02-22 ENCOUNTER — APPOINTMENT (OUTPATIENT)
Dept: GASTROENTEROLOGY | Facility: CLINIC | Age: 82
End: 2022-02-22

## 2022-02-22 DIAGNOSIS — Z01.818 ENCOUNTER FOR OTHER PREPROCEDURAL EXAMINATION: ICD-10-CM

## 2022-02-23 LAB — SARS-COV-2 N GENE NPH QL NAA+PROBE: NOT DETECTED

## 2022-02-25 ENCOUNTER — APPOINTMENT (OUTPATIENT)
Dept: GASTROENTEROLOGY | Facility: AMBULATORY MEDICAL SERVICES | Age: 82
End: 2022-02-25
Payer: MEDICARE

## 2022-02-25 PROCEDURE — 45380 COLONOSCOPY AND BIOPSY: CPT

## 2022-02-25 PROCEDURE — 43239 EGD BIOPSY SINGLE/MULTIPLE: CPT | Mod: 59

## 2022-03-08 ENCOUNTER — APPOINTMENT (OUTPATIENT)
Dept: PULMONOLOGY | Facility: CLINIC | Age: 82
End: 2022-03-08
Payer: MEDICARE

## 2022-03-08 VITALS — OXYGEN SATURATION: 98 % | HEART RATE: 78 BPM | DIASTOLIC BLOOD PRESSURE: 81 MMHG | SYSTOLIC BLOOD PRESSURE: 150 MMHG

## 2022-03-08 PROCEDURE — 99204 OFFICE O/P NEW MOD 45 MIN: CPT

## 2022-03-08 RX ORDER — NIFEDIPINE 30 MG/1
30 TABLET, FILM COATED, EXTENDED RELEASE ORAL
Refills: 0 | Status: ACTIVE | COMMUNITY

## 2022-03-08 NOTE — HISTORY OF PRESENT ILLNESS
[Former] : former [< 20 pack-years] : < 20 pack-years [Never] : never [TextBox_4] : ELENO VALDEZ is a 81 year old female who presents from Dr Javier Espinosa office for abnormal CT\par \par She is a former smoker, quit about 38 years ago; had a CT chest done at [facility] which shows abnormalities\par she denies every seeing a [facility] doc before\par no pfts\par not on any inhalers\par she has a cough with food sometimes; dry cough, sometimes a nocturnal cough\par no wheezing no dyspnea\par no night sweat\par no hemoptysis\par she has lost some weight- unintentionally but seems to be regaining it\par \par  [TextBox_11] : 0.25 [TextBox_13] : 20 [YearQuit] : 1983

## 2022-03-08 NOTE — PROCEDURE
[FreeTextEntry1] : [facility] CT chest 2022\par [place] CTA Chest 2019\par \par reports ** reviewed\par

## 2022-03-08 NOTE — CONSULT LETTER
[Dear  ___] : Dear  [unfilled], [Consult Letter:] : I had the pleasure of evaluating your patient, [unfilled]. [Please see my note below.] : Please see my note below. [Consult Closing:] : Thank you very much for allowing me to participate in the care of this patient.  If you have any questions, please do not hesitate to contact me. [Sincerely,] : Sincerely, [FreeTextEntry3] : Amanda Bustillo D.O.\par [facility] Sleep [facility]\par [phone]\par [email or web address]\par

## 2022-03-08 NOTE — PHYSICAL EXAM
[No Acute Distress] : no acute distress [Well Nourished] : well nourished [Well Developed] : well developed [Normal S1, S2] : normal s1, s2 [No Resp Distress] : no resp distress [No Acc Muscle Use] : no acc muscle use [Clear to Auscultation Bilaterally] : clear to auscultation bilaterally [Oriented x3] : oriented x3

## 2022-03-10 ENCOUNTER — APPOINTMENT (OUTPATIENT)
Dept: PULMONOLOGY | Facility: CLINIC | Age: 82
End: 2022-03-10
Payer: MEDICARE

## 2022-03-10 VITALS — HEART RATE: 82 BPM | SYSTOLIC BLOOD PRESSURE: 130 MMHG | DIASTOLIC BLOOD PRESSURE: 74 MMHG | OXYGEN SATURATION: 97 %

## 2022-03-10 LAB
POCT - HEMOGLOBIN (HGB), QUANTITATIVE, TRANSCUTANEOUS: 13.6
SARS-COV-2 N GENE NPH QL NAA+PROBE: NOT DETECTED

## 2022-03-10 PROCEDURE — 99213 OFFICE O/P EST LOW 20 MIN: CPT | Mod: 25

## 2022-03-10 PROCEDURE — 94729 DIFFUSING CAPACITY: CPT

## 2022-03-10 PROCEDURE — ZZZZZ: CPT

## 2022-03-10 PROCEDURE — 88738 HGB QUANT TRANSCUTANEOUS: CPT

## 2022-03-10 PROCEDURE — 94726 PLETHYSMOGRAPHY LUNG VOLUMES: CPT

## 2022-03-10 PROCEDURE — 94010 BREATHING CAPACITY TEST: CPT

## 2022-03-10 NOTE — ASSESSMENT
[FreeTextEntry1] : between 2019 and 2022, more nodules noted\par im 2019- 2 mm RUL, another subcm AYE nodule,  subcm LLL Nodule\par \par in 2022- [facility] scan shows growth and new lesions\par \par too small to do PET\par give her history of smoking, worried about malignancy\par she can either repeat CT in 3 months- tos ee if the nodules change or go to CTS For surgical intervention/diagnosis/therapy\par \par she will discuss with DR Javier Espinosa\par She has CTS information \par \par she will update me with a plan\par \par \par pfts shows mild obstructive disease- would hold on inhalers\par

## 2022-03-10 NOTE — HISTORY OF PRESENT ILLNESS
[Former] : former [TextBox_4] : ELENO VALDEZ is a 81 year old female who presents for formal pfts\par \par a former smoker, quit about 38 years ago; had a CT chest done at [facility] which shows abnormalities in 2022\par she had CT In [place] in 2019 also with nodules\par \par both reports scanned into chart\par \par \par occasional cough \par no wheezing no dyspnea\par never had pfts done\par \par  [YearQuit] : 1983

## 2022-03-10 NOTE — CONSULT LETTER
[Dear  ___] : Dear  [unfilled], [Courtesy Letter:] : I had the pleasure of seeing your patient, [unfilled], in my office today. [Please see my note below.] : Please see my note below. [Consult Closing:] : Thank you very much for allowing me to participate in the care of this patient.  If you have any questions, please do not hesitate to contact me. [FreeTextEntry3] : Amanda Bustillo D.O.\par [facility] Pulmonary & Sleep Medicine\par [phone]\par [email or web address]\par  [Sincerely,] : Sincerely,

## 2022-04-01 ENCOUNTER — APPOINTMENT (OUTPATIENT)
Dept: GASTROENTEROLOGY | Facility: CLINIC | Age: 82
End: 2022-04-01
Payer: MEDICARE

## 2022-04-01 VITALS
TEMPERATURE: 97.7 F | HEART RATE: 75 BPM | DIASTOLIC BLOOD PRESSURE: 66 MMHG | BODY MASS INDEX: 21.01 KG/M2 | HEIGHT: 60 IN | OXYGEN SATURATION: 99 % | WEIGHT: 107 LBS | SYSTOLIC BLOOD PRESSURE: 141 MMHG

## 2022-04-01 DIAGNOSIS — R97.0 ELEVATED CARCINOEMBRYONIC ANTIGEN [CEA]: ICD-10-CM

## 2022-04-01 DIAGNOSIS — K86.2 CYST OF PANCREAS: ICD-10-CM

## 2022-04-01 DIAGNOSIS — K29.70 GASTRITIS, UNSPECIFIED, W/OUT BLEEDING: ICD-10-CM

## 2022-04-01 DIAGNOSIS — R74.8 ABNORMAL LEVELS OF OTHER SERUM ENZYMES: ICD-10-CM

## 2022-04-01 DIAGNOSIS — K76.89 OTHER SPECIFIED DISEASES OF LIVER: ICD-10-CM

## 2022-04-01 DIAGNOSIS — B96.81 GASTRITIS, UNSPECIFIED, W/OUT BLEEDING: ICD-10-CM

## 2022-04-01 DIAGNOSIS — Z87.19 PERSONAL HISTORY OF OTHER DISEASES OF THE DIGESTIVE SYSTEM: ICD-10-CM

## 2022-04-01 PROCEDURE — 99214 OFFICE O/P EST MOD 30 MIN: CPT

## 2022-04-03 PROBLEM — K29.70 HELICOBACTER POSITIVE GASTRITIS: Status: ACTIVE | Noted: 2022-04-03

## 2022-04-03 PROBLEM — K76.89 LIVER CYST: Status: ACTIVE | Noted: 2019-06-25

## 2022-04-03 PROBLEM — R74.8 ELEVATED ALKALINE PHOSPHATASE LEVEL: Status: ACTIVE | Noted: 2022-04-03

## 2022-04-03 PROBLEM — R97.0 ELEVATED CEA: Status: ACTIVE | Noted: 2022-01-11

## 2022-04-03 LAB
ALBUMIN SERPL ELPH-MCNC: 4.7 G/DL
ALP BLD-CCNC: 137 U/L
ALT SERPL-CCNC: 20 U/L
ANION GAP SERPL CALC-SCNC: 13 MMOL/L
AST SERPL-CCNC: 27 U/L
BASOPHILS # BLD AUTO: 0.06 K/UL
BASOPHILS NFR BLD AUTO: 0.8 %
BILIRUB SERPL-MCNC: 0.3 MG/DL
BUN SERPL-MCNC: 24 MG/DL
CALCIUM SERPL-MCNC: 10.1 MG/DL
CHLORIDE SERPL-SCNC: 102 MMOL/L
CO2 SERPL-SCNC: 20 MMOL/L
CREAT SERPL-MCNC: 0.82 MG/DL
CRP SERPL-MCNC: <3 MG/L
EGFR: 71 ML/MIN/1.73M2
EOSINOPHIL # BLD AUTO: 0.05 K/UL
EOSINOPHIL NFR BLD AUTO: 0.7 %
GLUCOSE SERPL-MCNC: 92 MG/DL
HCT VFR BLD CALC: 37.8 %
HGB BLD-MCNC: 12.2 G/DL
IMM GRANULOCYTES NFR BLD AUTO: 0.1 %
LYMPHOCYTES # BLD AUTO: 2.3 K/UL
LYMPHOCYTES NFR BLD AUTO: 30.1 %
MAN DIFF?: NORMAL
MCHC RBC-ENTMCNC: 29.5 PG
MCHC RBC-ENTMCNC: 32.3 GM/DL
MCV RBC AUTO: 91.5 FL
MONOCYTES # BLD AUTO: 0.52 K/UL
MONOCYTES NFR BLD AUTO: 6.8 %
NEUTROPHILS # BLD AUTO: 4.7 K/UL
NEUTROPHILS NFR BLD AUTO: 61.5 %
PLATELET # BLD AUTO: 271 K/UL
POTASSIUM SERPL-SCNC: 4.9 MMOL/L
PROT SERPL-MCNC: 7.5 G/DL
RBC # BLD: 4.13 M/UL
RBC # FLD: 12.3 %
SODIUM SERPL-SCNC: 136 MMOL/L
WBC # FLD AUTO: 7.64 K/UL

## 2022-04-03 NOTE — ASSESSMENT
[FreeTextEntry1] : Patient with history of IBS, microscopic colitis, and history of ischemic colitis.  A recent colonoscopy did not reveal any evidence of microscopic colitis.\par She did have a recent CAT scan of the chest that revealed some pulmonary nodules.  This is being worked up.  She had a CAT scan of the abdomen and pelvis several months ago that revealed hepatic cysts which have been present previously and a pancreatic cyst.  She will be referred for an MRI.  She did have an elevated CEA level but she had been a smoker in the past.\par Patient had an upper endoscopy that revealed a hiatus hernia and patulous LES.  Biopsies were consistent with H. pylori gastritis.  Since her GI symptoms have just started to improve, treatment of the H. pylori gastritis will be held off for now.\par She did have a CBC and CHEM screen that were normal except for a mildly elevated alkaline phosphatase of 137.

## 2022-05-17 ENCOUNTER — APPOINTMENT (OUTPATIENT)
Dept: PULMONOLOGY | Facility: CLINIC | Age: 82
End: 2022-05-17
Payer: MEDICARE

## 2022-05-17 VITALS — SYSTOLIC BLOOD PRESSURE: 152 MMHG | DIASTOLIC BLOOD PRESSURE: 72 MMHG | OXYGEN SATURATION: 96 % | HEART RATE: 72 BPM

## 2022-05-17 PROCEDURE — 99213 OFFICE O/P EST LOW 20 MIN: CPT

## 2022-05-17 NOTE — HISTORY OF PRESENT ILLNESS
[Former] : former [TextBox_4] : ELENO VALDEZ is a 82 year old female who presents for f/u\par \par a former smoker, quit about 38 years ago; had a CT chest done at [facility] which shows abnormalities in 2022\par she had CT [place] in 2019 also with nodules\par \par has not seeN CTS\par \par here for f/u\par \par  \par no wheezing no dyspnea\par \par no change in medical history\par \par covid vaccine series done & booster done  [YearQuit] : 1983

## 2022-05-17 NOTE — ASSESSMENT
[FreeTextEntry1] : [place] chest 2019\[facility]\par \par get CT chest - she get rx today and try to get it done today\par CT chest noncontrast\par \par call me when its done so we can decide on next course of action

## 2022-05-19 ENCOUNTER — NON-APPOINTMENT (OUTPATIENT)
Age: 82
End: 2022-05-19

## 2022-11-17 ENCOUNTER — APPOINTMENT (OUTPATIENT)
Dept: PULMONOLOGY | Facility: CLINIC | Age: 82
End: 2022-11-17

## 2022-11-17 VITALS
WEIGHT: 107 LBS | HEIGHT: 60 IN | TEMPERATURE: 98.2 F | HEART RATE: 75 BPM | BODY MASS INDEX: 21.01 KG/M2 | DIASTOLIC BLOOD PRESSURE: 72 MMHG | OXYGEN SATURATION: 93 % | SYSTOLIC BLOOD PRESSURE: 144 MMHG

## 2022-11-17 PROCEDURE — 90662 IIV NO PRSV INCREASED AG IM: CPT

## 2022-11-17 PROCEDURE — 99213 OFFICE O/P EST LOW 20 MIN: CPT | Mod: 25

## 2022-11-17 PROCEDURE — G0008: CPT

## 2022-11-22 ENCOUNTER — APPOINTMENT (OUTPATIENT)
Dept: PULMONOLOGY | Facility: CLINIC | Age: 82
End: 2022-11-22

## 2022-11-22 PROCEDURE — 99441: CPT | Mod: 95

## 2022-11-22 NOTE — PHYSICAL EXAM
[No Acute Distress] : no acute distress [Well Nourished] : well nourished [Well Developed] : well developed [No Resp Distress] : no resp distress [No Acc Muscle Use] : no acc muscle use [Clear to Auscultation Bilaterally] : clear to auscultation bilaterally [No Focal Deficits] : no focal deficits [Oriented x3] : oriented x3

## 2022-11-22 NOTE — PROCEDURE
[FreeTextEntry1] : [place] ct chest\par [facility] ct chest 2/2022\[facility] ct chest 5/2022

## 2022-11-22 NOTE — HISTORY OF PRESENT ILLNESS
[Former] : former [TextBox_4] : ELENO VALDEZ is a 82 year old female who presents for f/u \par \par no breathing issues\par \par a former smoker, quit about 38 years ago; had a CT chest done at [facility] which shows abnormalities in 2022\par she had CT [place] in 2019 also with nodules\par we had  discussed PET scan previously but she had wanted to hold off and wait for repeat CT\par \par Smoking Status: former \par Year quit: 1983 \par

## 2022-12-19 ENCOUNTER — OUTPATIENT (OUTPATIENT)
Dept: OUTPATIENT SERVICES | Facility: HOSPITAL | Age: 82
LOS: 1 days | End: 2022-12-19
Payer: MEDICARE

## 2022-12-19 ENCOUNTER — APPOINTMENT (OUTPATIENT)
Dept: NUCLEAR MEDICINE | Facility: IMAGING CENTER | Age: 82
End: 2022-12-19

## 2022-12-19 DIAGNOSIS — Z98.51 TUBAL LIGATION STATUS: Chronic | ICD-10-CM

## 2022-12-19 DIAGNOSIS — Z98.890 OTHER SPECIFIED POSTPROCEDURAL STATES: Chronic | ICD-10-CM

## 2022-12-19 DIAGNOSIS — R93.89 ABNORMAL FINDINGS ON DIAGNOSTIC IMAGING OF OTHER SPECIFIED BODY STRUCTURES: ICD-10-CM

## 2022-12-19 PROCEDURE — A9552: CPT

## 2022-12-19 PROCEDURE — 78815 PET IMAGE W/CT SKULL-THIGH: CPT | Mod: 26,PI,MH

## 2022-12-19 PROCEDURE — 78815 PET IMAGE W/CT SKULL-THIGH: CPT

## 2022-12-20 NOTE — HISTORY OF PRESENT ILLNESS
no
[FreeTextEntry1] : Patient is a 81-year-old female with complaints of substernal chest pressure last year. Had cardiac w/u including cath with no evidence of CAD according to patient. Has not any recurrence. No heartburn or regurgitation.\par Also had elevated D dimer and PE r/o. \par Patient had a colonoscopy in 2017. 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 She has not had a flare in almost 4  years. She still has some  irritable bowel syndrome symptoms. She is on IB jeannette PRN and Align PRN.\par \par She had an abdominal sonogram in October that revealed some enlargement of her left hepatic liver cysts.  This was compared to a previous sonogram.  She has no pain.  Her weight is stable.\par The cyst on the right lobe is stable.  She does have a family history of liver cancer.  Her father had liver cancer.\par \par Patient has no further chest pain no nausea.  Her bowel movements are irregular with alternating normal bowel movements and constipation.  She takes milk of magnesia on a as needed basis.  She has had no further attacks of abdominal pain.\par However, she is eating smaller portions and has a decreased appetite and has lost about 8 pounds.\par She recently had a pelvic sonogram that revealed some fibroids but no other lesions.\par \par Patient had a CAT scan in August that revealed hepatic cysts.  She also had an atrophic pancreas and a 1.6 cm cystic lesion within the posterior uncinate process.\par She had an upper endoscopy in February that revealed a hiatus hernia and patulous LES.  Biopsies of the stomach revealed H. pylori gastritis.\par Patient also had a colonoscopy that was essentially normal.  Biopsies did not reveal any evidence of microscopic colitis.\par Blood work revealed a normal CBC.  CHEM screen was normal except for an alkaline phosphatase of 137.  The rest of the LFTs were normal.  CRP was normal\par \par \par \par

## 2023-01-18 ENCOUNTER — APPOINTMENT (OUTPATIENT)
Dept: THORACIC SURGERY | Facility: CLINIC | Age: 83
End: 2023-01-18
Payer: MEDICARE

## 2023-01-18 ENCOUNTER — NON-APPOINTMENT (OUTPATIENT)
Age: 83
End: 2023-01-18

## 2023-01-18 VITALS
SYSTOLIC BLOOD PRESSURE: 125 MMHG | HEIGHT: 60 IN | HEART RATE: 75 BPM | BODY MASS INDEX: 21.4 KG/M2 | OXYGEN SATURATION: 97 % | RESPIRATION RATE: 17 BRPM | WEIGHT: 109 LBS | DIASTOLIC BLOOD PRESSURE: 71 MMHG

## 2023-01-18 PROCEDURE — 99205 OFFICE O/P NEW HI 60 MIN: CPT

## 2023-01-18 RX ORDER — ENALAPRIL MALEATE 10 MG/1
10 TABLET ORAL
Qty: 90 | Refills: 0 | Status: DISCONTINUED | COMMUNITY
Start: 2018-05-31 | End: 2023-01-18

## 2023-01-18 RX ORDER — SODIUM SULFATE, POTASSIUM SULFATE, MAGNESIUM SULFATE 17.5; 3.13; 1.6 G/ML; G/ML; G/ML
17.5-3.13-1.6 SOLUTION, CONCENTRATE ORAL
Qty: 1 | Refills: 0 | Status: DISCONTINUED | COMMUNITY
Start: 2022-02-22 | End: 2023-01-18

## 2023-01-18 RX ORDER — RIFAXIMIN 550 MG/1
550 TABLET ORAL
Qty: 42 | Refills: 1 | Status: DISCONTINUED | COMMUNITY
Start: 2021-12-17 | End: 2023-01-18

## 2023-01-18 RX ORDER — KETOROLAC TROMETHAMINE 5 MG/ML
0.5 SOLUTION OPHTHALMIC
Qty: 10 | Refills: 0 | Status: DISCONTINUED | COMMUNITY
Start: 2021-05-24 | End: 2023-01-18

## 2023-01-18 RX ORDER — FLUTICASONE PROPIONATE 50 UG/1
50 SPRAY, METERED NASAL
Qty: 16 | Refills: 0 | Status: DISCONTINUED | COMMUNITY
Start: 2018-08-29 | End: 2023-01-18

## 2023-01-18 RX ORDER — PREDNISOLONE ACETATE 10 MG/ML
1 SUSPENSION/ DROPS OPHTHALMIC
Qty: 10 | Refills: 0 | Status: DISCONTINUED | COMMUNITY
Start: 2021-05-24 | End: 2023-01-18

## 2023-01-18 RX ORDER — BACITRACIN ZINC AND POLYMYXIN B SULFATES 500; 10000 [USP'U]/G; [USP'U]/G
500-10000 OINTMENT OPHTHALMIC
Qty: 35 | Refills: 0 | Status: DISCONTINUED | COMMUNITY
Start: 2018-09-25 | End: 2023-01-18

## 2023-01-18 RX ORDER — ACYCLOVIR 50 MG/G
5 OINTMENT TOPICAL
Qty: 15 | Refills: 0 | Status: DISCONTINUED | COMMUNITY
Start: 2018-09-04 | End: 2023-01-18

## 2023-01-18 RX ORDER — OFLOXACIN 3 MG/ML
0.3 SOLUTION/ DROPS OPHTHALMIC
Qty: 10 | Refills: 0 | Status: DISCONTINUED | COMMUNITY
Start: 2021-05-24 | End: 2023-01-18

## 2023-01-18 RX ORDER — METRONIDAZOLE 500 MG/1
500 TABLET ORAL 3 TIMES DAILY
Qty: 21 | Refills: 0 | Status: DISCONTINUED | COMMUNITY
Start: 2021-12-15 | End: 2023-01-18

## 2023-01-18 RX ORDER — OMEPRAZOLE 20 MG/1
20 CAPSULE, DELAYED RELEASE ORAL
Qty: 30 | Refills: 0 | Status: DISCONTINUED | COMMUNITY
Start: 2018-06-08 | End: 2023-01-18

## 2023-01-18 RX ORDER — CLOBETASOL PROPIONATE 0.5 MG/G
0.05 OINTMENT TOPICAL
Qty: 15 | Refills: 0 | Status: DISCONTINUED | COMMUNITY
Start: 2018-08-29 | End: 2023-01-18

## 2023-01-19 NOTE — DATA REVIEWED
[address]  Progress Note    CHIEF COMPLAINT:  Patient presents with: Follow - Up: Pt. is here for a f/u of bilateral wrists. Bilat.wrist XR 10/13/21. No new or worsening symptoms. Completed PT.  States feel 2008        Years since quittin.2      Smokeless tobacco: Never Used    Vaping Use      Vaping Use: Never used    Substance and Sexual Activity      Alcohol use: No        Alcohol/week: 0.0 standard drinks      Drug use: No      Sexual activity: results is:   [facility] Lab Encounter on 04/10/2021   Component Date Value Ref Range Status   • SARS-CoV-2 ([place]) 04/10/2021 Not Detected  Not Detected Final   ]      Radiology Imaging:  I reviewed with the patient his X-ray of the wrist bilateral from 10/13/ syndrome. He has done very well with occupational therapy and over-the-counter NSAIDs. At this time, I am recommending he continue with his home exercise program and use NSAIDs as needed. He should also continue using his resting wrist plants.   As he no [FreeTextEntry1] : I independently reviewed the CT Chest on 11/17/22, PET/CT 12/19/22, and PFT 3/10/22

## 2023-01-19 NOTE — REVIEW OF SYSTEMS
[Cough] : cough [Constipation] : constipation [Diarrhea] : diarrhea [Negative] : Heme/Lymph [SOB on Exertion] : no shortness of breath during exertion [FreeTextEntry6] : occasional non-productive cough [FreeTextEntry7] : hx of IBS

## 2023-01-19 NOTE — HISTORY OF PRESENT ILLNESS
[FreeTextEntry1] : Ms. ELENO VALDZE, 82 year old female, former smoker (quit 38 years ago, hx 1/4 PPD x 20 years), w/ hx of HTN, HLD, IBS, glaucoma, anxiety, thyroid nodules, who presents for evaluation of lung nodules, referred by PULM Dr. Bustillo/and PCP/Card Dr. Leatha Espinosa.\par \par PFT 3/10/22:\par - FVC 2.28 L ( 96%), FEV1 1.54L (90%), DLCO 11.97L ( 65%)\par \par CT Chest on 11/17/22:\par - There is a changing pattern of branching nodules within both lungs. This includes a new cluster of nodules many with branching tree in bud appearance as well as tubular shaped densities  laterally within the RUL c/w opacification of peripheral airways/mucous plugging. \par - There is a changing distribution of a few small nodules within the RML c/w mucous plugging. \par - 3 mm calcified pulmonary granuloma laterally within the RUL\par - New area of branching densities laterally at the right lung base c/w opacification of peripheral airways/mucous plugging. \par - New small cluster of branching micronodules with tree in bud appearance posteromedially within the LLL (4:114)\par - Stable RUL 1.6 cm x 0.8 cm ill defined gg nodular opacity centrally (4:54)\par - Another stable smaller ill defined nodular ggo more superiorly at the central right lung apex ( 4:37)\par \par PET/CT 12/19/22:\par - Ill-defined cluster of groundglass opacities in the lateral RIGHT MIDDLE lobe show associated mild uptake (SUV 1.5, image 75). This is most likely related to the largest contiguous area of parenchymal change, measuring approximately 1.9 x 1.4 cm (image 75), with heterogeneous activity. A smaller 0.7 cm lesion seen anteromedially on the same image is trace trace activity (SUV 0.6). This is nonetheless distinct from the adjacent lung.\par \par CT Chest 11/14/19:\par - (images unavailable today, but report states:)\par - 2 mm nodule within the RUL\par -1 cm focal area of GGO within ght RUL centrally\par - smaller ill defined focal area of GGO within the AYE\par - subcentimeter ill-defined focal area of GGO within the LLL\par - mild subsegmental atelectasis versus pulmonary scarring within the dependent portions of both lungs\par \par She presents today and reports that she generally feels well.  She denies any fever, chills, cough, shortness of breath, chest pain, hemoptysis, or recent illness.

## 2023-01-19 NOTE — ASSESSMENT
[FreeTextEntry1] : 82 year old female, former smoker (quit 38 years ago, hx 1/4 PPD x 20 years), w/ hx of HTN, HLD, IBS, glaucoma, anxiety, thyroid nodules.  She was referred by PULM Dr. Bustillo/Dr. Javier Espinosa for evaluation of lung nodules. \par \par PFT 3/10/22:\par - FVC 2.28 L ( 96%), FEV1 1.54L (90%), DLCO 11.97L ( 65%)\par \par CT Chest on 11/17/22:\par - changing pattern of branching nodules within both lungs. This includes a new cluster of nodules many with branching tree in bud appearance as well as tubular shaped densities laterally within the RUL c/w opacification of peripheral airways/mucous plugging. \par - There is a changing distribution of a few small nodules within the RML c/w mucous plugging. \par - 3 mm calcified pulmonary granuloma laterally within the RUL\par - New area of branching densities laterally at right lung base c/w opacification of peripheral airways/mucous plugging. \par - New small cluster of branching micronodules with tree in bud appearance posteromedially within the LLL (4:114)\par - Stable RUL 1.6 cm x 0.8 cm ill defined gg nodular opacity centrally (4:54)\par - Another stable smaller ill defined nodular ggo more superiorly at the central right lung apex ( 4:37)\par \par PET/CT 12/19/22:\par - Ill-defined cluster of groundglass opacities in the lateral RIGHT MIDDLE lobe show associated mild uptake (SUV 1.5, image 75). This is most likely related to the largest contiguous area of parenchymal change, measuring approximately 1.9 x 1.4 cm (image 75), with heterogeneous activity. A smaller 0.7 cm lesion seen anteromedially on the same image is trace trace activity (SUV 0.6). This is nonetheless distinct from the adjacent lung.\par \par CT Chest 11/14/19:\par - (images unavailable today, but report states:)\par - 2 mm nodule within the RUL\par -1 cm focal area of GGO within ght RUL centrally\par - smaller ill defined focal area of GGO within the AYE\par - subcentimeter ill-defined focal area of GGO within the LLL\par - mild subsegmental atelectasis versus pulmonary scarring within the dependent portions of both lungs\par \par I have reviewed the patient's medical records and diagnostic images during the time of this office visit, and I have made the following recommendation:\par 1. I have recommended that she obtain the CT Chest images from 2019 scan for comparison. \par  I  would like her to return in 3 months with noncontrast Chest CT Scan.\par \par I personally performed the services described in the documentation, reviewed the documentation recorded by the scribe in my presence and it accurately and completely records my words and actions.\par \par I, Liss Marcos NP, am scribing for and the presence of Dr. Juarez, the following sections HISTORY OF PRESENT ILLNESS, PAST MEDICAL/FAMILY/SOCIAL HISTORY; REVIEW OF SYSTEMS; VITAL SIGNS; PHYSICAL EXAM; DISPOSITION.\par

## 2023-01-19 NOTE — CONSULT LETTER
[Dear  ___] : Dear  [unfilled], [Consult Letter:] : I had the pleasure of evaluating your patient, [unfilled]. [( Thank you for referring [unfilled] for consultation for _____ )] : Thank you for referring [unfilled] for consultation for [unfilled] [Please see my note below.] : Please see my note below. [Consult Closing:] : Thank you very much for allowing me to participate in the care of this patient.  If you have any questions, please do not hesitate to contact me. [Sincerely,] : Sincerely, [FreeTextEntry2] : JOHNATHAN Bustillo (PULM/ref)\par Dr. Javier Espinosa [FreeTextEntry3] : Obi Juarez MD, FACS \par Vice Chairperson, Thoracic Surgery, [facility] Department of Cardiovascular & Thoracic Surgery \par , [employer] [facility] [employer] at [facility]

## 2023-01-19 NOTE — PHYSICAL EXAM
[General Appearance - Alert] : alert [General Appearance - In No Acute Distress] : in no acute distress [General Appearance - Well Nourished] : well nourished [General Appearance - Well Developed] : well developed [Sclera] : the sclera and conjunctiva were normal [Outer Ear] : the ears and nose were normal in appearance [Hearing Threshold Finger Rub Not Lyman] : hearing was normal [Neck Appearance] : the appearance of the neck was normal [Neck Cervical Mass (___cm)] : no neck mass was observed [Respiration, Rhythm And Depth] : normal respiratory rhythm and effort [Auscultation Breath Sounds / Voice Sounds] : lungs were clear to auscultation bilaterally [Heart Rate And Rhythm] : heart rate was normal and rhythm regular [Heart Sounds] : normal S1 and S2 [Examination Of The Chest] : the chest was normal in appearance [Abdomen Soft] : soft [Abdomen Tenderness] : non-tender [Cervical Lymph Nodes Enlarged Posterior Bilaterally] : posterior cervical [Cervical Lymph Nodes Enlarged Anterior Bilaterally] : anterior cervical [Supraclavicular Lymph Nodes Enlarged Bilaterally] : supraclavicular [No CVA Tenderness] : no ~M costovertebral angle tenderness [Abnormal Walk] : normal gait [Nail Clubbing] : no clubbing  or cyanosis of the fingernails [Skin Color & Pigmentation] : normal skin color and pigmentation [] : no rash [No Focal Deficits] : no focal deficits [Oriented To Time, Place, And Person] : oriented to person, place, and time [Impaired Insight] : insight and judgment were intact [Affect] : the affect was normal [FreeTextEntry1] : deferred

## 2023-04-19 ENCOUNTER — NON-APPOINTMENT (OUTPATIENT)
Age: 83
End: 2023-04-19

## 2023-04-19 ENCOUNTER — APPOINTMENT (OUTPATIENT)
Dept: THORACIC SURGERY | Facility: CLINIC | Age: 83
End: 2023-04-19
Payer: MEDICARE

## 2023-04-19 VITALS
WEIGHT: 108 LBS | DIASTOLIC BLOOD PRESSURE: 71 MMHG | HEART RATE: 69 BPM | BODY MASS INDEX: 21.2 KG/M2 | SYSTOLIC BLOOD PRESSURE: 129 MMHG | OXYGEN SATURATION: 98 % | HEIGHT: 60 IN

## 2023-04-19 PROCEDURE — 99214 OFFICE O/P EST MOD 30 MIN: CPT

## 2023-04-20 RX ORDER — NIFEDIPINE 20 MG/1
CAPSULE ORAL
Refills: 0 | Status: ACTIVE | COMMUNITY

## 2023-04-21 NOTE — HISTORY OF PRESENT ILLNESS
[FreeTextEntry1] : Ms. ELENO VALDEZ, 82 year old female, former smoker (quit 38 years ago, hx 1/4 PPD x 20 years), w/ hx of HTN, HLD, IBS, glaucoma, anxiety, thyroid nodules, who presents for evaluation of lung nodules, referred by PULM Dr. Bustillo/and PCP/Card Dr. Leatha Espinosa.\par \par PFT 3/10/22:\par - FVC 2.28 L ( 96%), FEV1 1.54L (90%), DLCO 11.97L ( 65%)\par \par CT Chest on 11/17/22:\par - There is a changing pattern of branching nodules within both lungs. This includes a new cluster of nodules many with branching tree in bud appearance as well as tubular shaped densities  laterally within the RUL c/w opacification of peripheral airways/mucous plugging. \par - There is a changing distribution of a few small nodules within the RML c/w mucous plugging. \par - 3 mm calcified pulmonary granuloma laterally within the RUL\par - New area of branching densities laterally at the right lung base c/w opacification of peripheral airways/mucous plugging. \par - New small cluster of branching micronodules with tree in bud appearance posteromedially within the LLL (4:114)\par - Stable RUL 1.6 cm x 0.8 cm ill defined gg nodular opacity centrally (4:54)\par - Another stable smaller ill defined nodular ggo more superiorly at the central right lung apex ( 4:37)\par \par PET/CT 12/19/22:\par - Ill-defined cluster of groundglass opacities in the lateral RIGHT MIDDLE lobe show associated mild uptake (SUV 1.5, image 75). This is most likely related to the largest contiguous area of parenchymal change, measuring approximately 1.9 x 1.4 cm (image 75), with heterogeneous activity. A smaller 0.7 cm lesion seen anteromedially on the same image is trace trace activity (SUV 0.6). This is nonetheless distinct from the adjacent lung.\par \par CT Chest 11/14/19:\par - (images unavailable today, but report states:)\par - 2 mm nodule within the RUL\par -1 cm focal area of GGO within ght RUL centrally\par - smaller ill defined focal area of GGO within the AYE\par - subcentimeter ill-defined focal area of GGO within the LLL\par - mild subsegmental atelectasis versus pulmonary scarring within the dependent portions of both lungs\par \par CT Chest on 04/14/2023 ():\par - Ground-glass density measuring 1.6 x 0.8 cm in RUL centrally. This includes tree-in-bud branching and nodular densities in RUL peripherally. This includes mucoid impactions in RML centrally. This includes mild bronchiectasis in bilateral lower lobes.\par \par \par She presents today for 3 months follow up. Overall, she reports to be feeling well. Denies any chest pain, shortness of breath, cough, or hemoptysis.

## 2023-04-21 NOTE — CONSULT LETTER
[FreeTextEntry2] : JOHNATHAN Bustillo (PULM/ref)\par Dr. Javier Espinosa [FreeTextEntry3] : Obi Juarez MD, FACS \par Vice Chairperson, Thoracic Surgery, [facility] Department of Cardiovascular & Thoracic Surgery \par , [employer] [facility] [employer] at [facility]

## 2023-04-21 NOTE — ASSESSMENT
[FreeTextEntry1] : 82 year old female, former smoker (quit 38 years ago, hx 1/4 PPD x 20 years), w/ hx of HTN, HLD, IBS, glaucoma, anxiety, thyroid nodules. She was referred by PULM Dr. Bustillo/Dr. Javier Espinosa for evaluation of lung nodules. \par \par I have independently reviewed the medical records and imaging at the time of this office consultation. CT Chest reviewed with patient, revealing RUL groundglass density, which I think is most likely  inflammatory. I recommend she returns to clinic in 6 months CT Chest without contrast for re-evaluation. She is agreeable. Continue follow up with pulm, GI, and PCP. \par \par Recommendations reviewed with patient during this office visit, and all questions answered; Patient instructed on the importance of follow up and verbalizes understanding. \par \par \par \par I, JANES Hernández, personally performed the evaluation and management (E/M) services for this established patient. That E/M includes conducting the examination, assessing all new/exacerbated conditions, and establishing a new plan of care. Today, my ACP, Charan Chauhan NP, was here to observe my evaluation and management services for this new problem/exacerbated condition to be followed going forward.\par \par

## 2023-10-18 ENCOUNTER — NON-APPOINTMENT (OUTPATIENT)
Age: 83
End: 2023-10-18

## 2023-11-07 ENCOUNTER — APPOINTMENT (OUTPATIENT)
Dept: PULMONOLOGY | Facility: CLINIC | Age: 83
End: 2023-11-07
Payer: MEDICARE

## 2023-11-07 VITALS
HEART RATE: 70 BPM | HEIGHT: 60 IN | DIASTOLIC BLOOD PRESSURE: 80 MMHG | OXYGEN SATURATION: 97 % | BODY MASS INDEX: 20.81 KG/M2 | SYSTOLIC BLOOD PRESSURE: 129 MMHG | WEIGHT: 106 LBS | TEMPERATURE: 97.8 F

## 2023-11-07 DIAGNOSIS — Z87.891 PERSONAL HISTORY OF NICOTINE DEPENDENCE: ICD-10-CM

## 2023-11-07 DIAGNOSIS — R93.89 ABNORMAL FINDINGS ON DIAGNOSTIC IMAGING OF OTHER SPECIFIED BODY STRUCTURES: ICD-10-CM

## 2023-11-07 PROCEDURE — 99213 OFFICE O/P EST LOW 20 MIN: CPT

## 2023-11-09 PROBLEM — R93.89 ABNORMAL CHEST CT: Status: ACTIVE | Noted: 2022-03-08

## 2023-11-29 ENCOUNTER — APPOINTMENT (OUTPATIENT)
Dept: THORACIC SURGERY | Facility: CLINIC | Age: 83
End: 2023-11-29
Payer: MEDICARE

## 2023-11-29 DIAGNOSIS — R91.8 OTHER NONSPECIFIC ABNORMAL FINDING OF LUNG FIELD: ICD-10-CM

## 2023-11-29 PROCEDURE — 99442: CPT | Mod: 95

## 2024-01-23 ENCOUNTER — APPOINTMENT (OUTPATIENT)
Dept: PULMONOLOGY | Facility: CLINIC | Age: 84
End: 2024-01-23
Payer: MEDICARE

## 2024-01-23 VITALS
HEART RATE: 84 BPM | OXYGEN SATURATION: 97 % | BODY MASS INDEX: 20.62 KG/M2 | WEIGHT: 105 LBS | TEMPERATURE: 97.9 F | RESPIRATION RATE: 15 BRPM | HEIGHT: 60 IN | DIASTOLIC BLOOD PRESSURE: 70 MMHG | SYSTOLIC BLOOD PRESSURE: 144 MMHG

## 2024-01-23 DIAGNOSIS — R05.9 COUGH, UNSPECIFIED: ICD-10-CM

## 2024-01-23 LAB — HEMOGLOBIN: 10.8

## 2024-01-23 PROCEDURE — 94010 BREATHING CAPACITY TEST: CPT

## 2024-01-23 PROCEDURE — 94727 GAS DIL/WSHOT DETER LNG VOL: CPT

## 2024-01-23 PROCEDURE — 94729 DIFFUSING CAPACITY: CPT

## 2024-01-23 PROCEDURE — 85018 HEMOGLOBIN: CPT | Mod: QW

## 2024-07-02 ENCOUNTER — APPOINTMENT (OUTPATIENT)
Dept: PULMONOLOGY | Facility: CLINIC | Age: 84
End: 2024-07-02

## 2024-08-27 ENCOUNTER — APPOINTMENT (OUTPATIENT)
Dept: PULMONOLOGY | Facility: CLINIC | Age: 84
End: 2024-08-27
Payer: MEDICARE

## 2024-08-27 VITALS
BODY MASS INDEX: 18.46 KG/M2 | SYSTOLIC BLOOD PRESSURE: 136 MMHG | HEIGHT: 60 IN | HEART RATE: 64 BPM | DIASTOLIC BLOOD PRESSURE: 71 MMHG | WEIGHT: 94.06 LBS | OXYGEN SATURATION: 96 % | TEMPERATURE: 98 F

## 2024-08-27 DIAGNOSIS — R93.89 ABNORMAL FINDINGS ON DIAGNOSTIC IMAGING OF OTHER SPECIFIED BODY STRUCTURES: ICD-10-CM

## 2024-08-27 DIAGNOSIS — R63.0 ANOREXIA: ICD-10-CM

## 2024-08-27 PROCEDURE — 99214 OFFICE O/P EST MOD 30 MIN: CPT

## 2024-08-27 PROCEDURE — G2211 COMPLEX E/M VISIT ADD ON: CPT

## 2024-08-27 NOTE — PHYSICAL EXAM
[No Acute Distress] : no acute distress [No Resp Distress] : no resp distress [No Acc Muscle Use] : no acc muscle use [Clear to Auscultation Bilaterally] : clear to auscultation bilaterally [Oriented x3] : oriented x3

## 2024-08-27 NOTE — HISTORY OF PRESENT ILLNESS
[Former] : former [TextBox_4] : ELENO VALDEZ is a 82 year old female who presents for f/u on abnormal cT chest a former smoker, quit about  40 years ago; had a CT chest done at [facility] which shows abnormalities as long as  2022 PET mld uptake in lung  has seenCTS seen about a eyar ago  has lost about 15 lbs unintentional- poor appeitte CT a/p shows pancreatic cysts seeing GI soon  daughter now with breast ca   smoking Status: former  Year quit: 1983

## 2024-08-27 NOTE — PROCEDURE
[FreeTextEntry1] : 6/2024 GGO lung nodules are unchnaged from prior CT a/p- pancreatic cysts   previous data reviewed: 10/2023- [facility] ct chest- stable 1. 6 GGO, smaller micronodules, mucous plugging   4/2023 [facility] ct [facility]- again seen 1.6 X 0.8cm RUL GGO; tree in bud peripheral bronchiectasis bilateral LL  [place] ct chest [facility] ct chest 2/2022 [facility] ct chest 5/2022  PET scan 2022

## 2024-11-20 ENCOUNTER — NON-APPOINTMENT (OUTPATIENT)
Age: 84
End: 2024-11-20

## 2024-12-04 ENCOUNTER — APPOINTMENT (OUTPATIENT)
Dept: THORACIC SURGERY | Facility: CLINIC | Age: 84
End: 2024-12-04
Payer: MEDICARE

## 2024-12-04 VITALS
DIASTOLIC BLOOD PRESSURE: 57 MMHG | SYSTOLIC BLOOD PRESSURE: 122 MMHG | WEIGHT: 94 LBS | BODY MASS INDEX: 18.46 KG/M2 | OXYGEN SATURATION: 98 % | HEART RATE: 83 BPM | RESPIRATION RATE: 17 BRPM | HEIGHT: 60 IN

## 2024-12-04 DIAGNOSIS — R91.8 OTHER NONSPECIFIC ABNORMAL FINDING OF LUNG FIELD: ICD-10-CM

## 2024-12-04 PROCEDURE — 99214 OFFICE O/P EST MOD 30 MIN: CPT

## 2025-03-19 ENCOUNTER — NON-APPOINTMENT (OUTPATIENT)
Age: 85
End: 2025-03-19

## 2025-03-19 ENCOUNTER — APPOINTMENT (OUTPATIENT)
Dept: THORACIC SURGERY | Facility: CLINIC | Age: 85
End: 2025-03-19
Payer: MEDICARE

## 2025-03-19 DIAGNOSIS — R91.8 OTHER NONSPECIFIC ABNORMAL FINDING OF LUNG FIELD: ICD-10-CM

## 2025-03-19 PROCEDURE — 99213 OFFICE O/P EST LOW 20 MIN: CPT
